# Patient Record
Sex: MALE | Race: WHITE | NOT HISPANIC OR LATINO | Employment: UNEMPLOYED | URBAN - METROPOLITAN AREA
[De-identification: names, ages, dates, MRNs, and addresses within clinical notes are randomized per-mention and may not be internally consistent; named-entity substitution may affect disease eponyms.]

---

## 2023-08-21 ENCOUNTER — APPOINTMENT (EMERGENCY)
Dept: CT IMAGING | Facility: HOSPITAL | Age: 23
End: 2023-08-21
Payer: COMMERCIAL

## 2023-08-21 ENCOUNTER — HOSPITAL ENCOUNTER (EMERGENCY)
Facility: HOSPITAL | Age: 23
Discharge: HOME | End: 2023-08-21
Attending: EMERGENCY MEDICINE
Payer: COMMERCIAL

## 2023-08-21 VITALS
RESPIRATION RATE: 18 BRPM | HEART RATE: 54 BPM | OXYGEN SATURATION: 100 % | DIASTOLIC BLOOD PRESSURE: 79 MMHG | WEIGHT: 200.84 LBS | SYSTOLIC BLOOD PRESSURE: 143 MMHG | TEMPERATURE: 98 F

## 2023-08-21 DIAGNOSIS — S06.0XAA CONCUSSION: Primary | ICD-10-CM

## 2023-08-21 PROCEDURE — 70450 CT HEAD/BRAIN W/O DYE: CPT

## 2023-08-21 PROCEDURE — 99284 EMERGENCY DEPT VISIT MOD MDM: CPT

## 2023-08-21 PROCEDURE — G1004 CDSM NDSC: HCPCS

## 2023-08-21 NOTE — Clinical Note
Alexandra Brown was seen and treated in our emergency department on 8/21/2023. No restrictions            Diagnosis:     Milagros   is off the rest of the shift today. He may return on this date: 08/22/2023         If you have any questions or concerns, please don't hesitate to call.       Sharda Carr PA-C    ______________________________           _______________          _______________  Hospital Representative                              Date                                Time

## 2023-08-21 NOTE — ED PROVIDER NOTES
History  Chief Complaint   Patient presents with   • Head Injury w/LOC     States was playing a board game and a door fell on head. States LOC and feeling "foggy". 60-year-old male with PMH of asthma presenting for evaluation of head strike with positive LOC  Patient was reportedly playing board games when somebody accidentally pushed a large wooden door that was then against the wall over, landing on the top of his head. Per patient's visitor at bedside, he lost consciousness for 15 to 30 seconds before waking up and appearing groggy and slow to respond. He denies use of blood thinners, vomiting. None       History reviewed. No pertinent past medical history. History reviewed. No pertinent surgical history. History reviewed. No pertinent family history. I have reviewed and agree with the history as documented. E-Cigarette/Vaping     E-Cigarette/Vaping Substances     Social History     Tobacco Use   • Smoking status: Never   • Smokeless tobacco: Never   Substance Use Topics   • Alcohol use: Yes     Comment: social   • Drug use: Never       Review of Systems   Constitutional: Negative for chills and fever. HENT: Negative for ear pain and sore throat. Eyes: Negative for pain and visual disturbance. Respiratory: Negative for cough and shortness of breath. Cardiovascular: Negative for chest pain and palpitations. Gastrointestinal: Negative for abdominal pain and vomiting. Genitourinary: Negative for dysuria and hematuria. Musculoskeletal: Negative for arthralgias and back pain. Skin: Negative for color change and rash. Neurological: Positive for light-headedness and headaches. Negative for seizures and syncope. All other systems reviewed and are negative. Physical Exam  Physical Exam  Vitals and nursing note reviewed. Constitutional:       General: He is not in acute distress. Appearance: He is well-developed. HENT:      Head: Normocephalic and atraumatic. Eyes:      Conjunctiva/sclera: Conjunctivae normal.   Cardiovascular:      Rate and Rhythm: Normal rate and regular rhythm. Heart sounds: No murmur heard. Pulmonary:      Effort: Pulmonary effort is normal. No respiratory distress. Breath sounds: Normal breath sounds. Abdominal:      Palpations: Abdomen is soft. Tenderness: There is no abdominal tenderness. Musculoskeletal:         General: No swelling. Cervical back: Neck supple. Skin:     General: Skin is warm and dry. Capillary Refill: Capillary refill takes less than 2 seconds. Neurological:      General: No focal deficit present. Mental Status: He is alert and oriented to person, place, and time. GCS: GCS eye subscore is 4. GCS verbal subscore is 5. GCS motor subscore is 6. Cranial Nerves: Cranial nerves 2-12 are intact. Sensory: Sensation is intact. Motor: Motor function is intact. Coordination: Coordination is intact. Gait: Gait is intact. Comments: AOx3, however somewhat slow to respond. Otherwise neurologically intact, no focal findings. Psychiatric:         Mood and Affect: Mood normal.         Vital Signs  ED Triage Vitals [08/21/23 0227]   Temperature Pulse Respirations Blood Pressure SpO2   98 °F (36.7 °C) (!) 54 18 143/79 100 %      Temp Source Heart Rate Source Patient Position - Orthostatic VS BP Location FiO2 (%)   Oral Monitor Sitting Right arm --      Pain Score       7           Vitals:    08/21/23 0227   BP: 143/79   Pulse: (!) 54   Patient Position - Orthostatic VS: Sitting         Visual Acuity  Visual Acuity    Flowsheet Row Most Recent Value   L Pupil Size (mm) 3   R Pupil Size (mm) 3          ED Medications  Medications - No data to display    Diagnostic Studies  Results Reviewed     None                 CT head without contrast   Final Result by Jackie Tomlin MD (08/21 0534)      No acute intracranial abnormality.                   Workstation performed: [id number]                    Procedures  Procedures         ED Course                               SBIRT 20yo+    Flowsheet Row Most Recent Value   Initial Alcohol Screen: US AUDIT-C     1. How often do you have a drink containing alcohol? 0 Filed at: 08/21/2023 0556   2. How many drinks containing alcohol do you have on a typical day you are drinking? 0 Filed at: 08/21/2023 0556   3a. Male UNDER 65: How often do you have five or more drinks on one occasion? 0 Filed at: 08/21/2023 0556   3b. FEMALE Any Age, or MALE 65+: How often do you have 4 or more drinks on one occassion? 0 Filed at: 08/21/2023 0556   Audit-C Score 0 Filed at: 08/21/2023 9386   LISETTE: How many times in the past year have you. .. Used an illegal drug or used a prescription medication for non-medical reasons? Never Filed at: 08/21/2023 [address]                    Medical Decision Making  16-year-old male presenting for evaluation of head strike with LOC. Exam: Patient somewhat slow to respond but in NAD, AOx3, vitals WNL. No tenderness to palpation of occiput, no hematoma or laceration noted. Neurologically intact. Work-up: CT head without contrast.    No acute abnormalities on CT. Patient likely has a mild concussion. Educated patient on his condition, discussed supportive management, follow-up instructions, and return precautions. Patient expresses understanding of the condition, treatment plan, follow-up instructions, and return precautions. Amount and/or Complexity of Data Reviewed  Radiology: ordered. Disposition  Final diagnoses:   Concussion     Time reflects when diagnosis was documented in both MDM as applicable and the Disposition within this note     Time User Action Codes Description Comment    8/21/2023  5:33 AM Ceasar Reese Add [S06. 0XAA] Concussion       ED Disposition     ED Disposition   Discharge    Condition   Stable    Date/Time   Mon Aug 21, 2023  5:33 AM    Comment   Carol Anderson discharge to home/self care.               Follow-up Information     Follow up With Specialties Details Why Contact Info Additional [address] [facility]  If symptoms worsen [address] [address] [phone] [facility], [facility], [place], [place], [address] [place] [address] Road          There are no discharge medications for this patient. No discharge procedures on file.     PDMP Review     None          ED Provider  Electronically Signed by           Frances Li PA-C  08/21/23 9088

## 2023-08-21 NOTE — DISCHARGE INSTRUCTIONS
I did not see any significant injury on your CT scan. You most likely have a mild concussion. There is no specific treatment for this other than rest.  You can follow-up with neurology if you develop worsening symptoms. It may take a while for symptoms to fully resolve.